# Patient Record
Sex: MALE
[De-identification: names, ages, dates, MRNs, and addresses within clinical notes are randomized per-mention and may not be internally consistent; named-entity substitution may affect disease eponyms.]

---

## 2021-01-25 ENCOUNTER — APPOINTMENT (OUTPATIENT)
Dept: UROLOGY | Facility: CLINIC | Age: 31
End: 2021-01-25

## 2021-01-29 PROBLEM — Z00.00 ENCOUNTER FOR PREVENTIVE HEALTH EXAMINATION: Status: ACTIVE | Noted: 2021-01-29

## 2021-02-02 ENCOUNTER — APPOINTMENT (OUTPATIENT)
Dept: UROLOGY | Facility: CLINIC | Age: 31
End: 2021-02-02
Payer: COMMERCIAL

## 2021-02-02 ENCOUNTER — TRANSCRIPTION ENCOUNTER (OUTPATIENT)
Age: 31
End: 2021-02-02

## 2021-02-05 ENCOUNTER — APPOINTMENT (OUTPATIENT)
Dept: UROLOGY | Facility: CLINIC | Age: 31
End: 2021-02-05
Payer: COMMERCIAL

## 2021-02-05 DIAGNOSIS — F52.32 MALE ORGASMIC DISORDER: ICD-10-CM

## 2021-02-05 PROCEDURE — 99204 OFFICE O/P NEW MOD 45 MIN: CPT | Mod: 95

## 2021-03-16 ENCOUNTER — TRANSCRIPTION ENCOUNTER (OUTPATIENT)
Age: 31
End: 2021-03-16

## 2021-04-12 PROBLEM — F52.32 DELAYED EJACULATION: Status: ACTIVE | Noted: 2021-03-19

## 2021-04-12 NOTE — ASSESSMENT
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  Primary and ejaculation occurs when men was never able to ejaculate intravaginally.  Secondary and ejaculation occurs when man has problems with intravaginal ejaculation after normal performance.  Ejaculatory disorders can be classified based on time, volume of ejaculation, and force of ejaculation.  If he takes more than 30 minutes of sexual stimulation to ejaculate, then men suffers from delayed ejaculation.  Decreased volume of ejaculation is often the result of medication like alpha blockers or 5 alpha reductase inhibitors however it can also be related to poor arousal and affect your nerve pathways with abnormal function of seminal vesicles and prostate.  Pelvic floor dysfunction with lack of relaxation of pelvic floor can also decrease volume of ejaculation.  Seminal vesicles of obstruction either functional or anatomical can result in ejaculatory volume decreases.  Force of ejaculation depends on contractility of bulbocavernosus muscle, seminal vesicles and prostate.  Tensioning the pelvic floor, trying to push the semen out, low level of arousal can all affect force of ejaculation.  Abnormal closure of bladder neck, BPH are also associated with decreased force of ejaculation.\par \par The reasons for delayed ejaculation or decreased force and volume of ejaculation are typically related to inadequate arousal and poor progression of arousal.  This can be due to decrease sensitivity in the penis, abnormal processing and recognition of sexual cues specifically visual, tactile, olfactory, auditory and recollection of events from the past including fantasies.\par \par Evaluation focuses on identifying medical reasons like diabetes, heavy metal poisoning, alcohol abuse, sensory nerve dysfunction, abnormal hormonal profile.\par \par Functional dynamic ejaculatory study allows for direct observation of pattern of stimulation, and answer the question if patient have idiosyncratic pattern of stimulation or tenses up pelvic floor muscles.  Transrectal ultrasound with ejaculatory study allows to better identified if functional or anatomical obstruction exists.\par \par There is no FDA approved medical treatment for ejaculatory dysfunction at this point.\par \par Correction of testosterone has been shown to improve volume of ejaculation and force of ejaculation.\par \par Dopaminergic signaling stimulation with Mirapex or trazodone has been shown to increase arousal and dialysis potentially improve ejaculatory force and ejaculation.\par \par Above was discussed with the patient.  We will start medical treatment as prescribed and patient will follow-up with additional studies as needed.\par \par

## 2021-04-12 NOTE — HISTORY OF PRESENT ILLNESS
[Home] : at home, [unfilled] , at the time of the visit. [Medical Office: ([facility])___] : at the medical office located in  [Verbal consent obtained from patient] : the patient, [unfilled] [FreeTextEntry1] : The patient-doctor relationship has been established in a face to face fashion via real time video/audio HIPAA compliant communication using telemedicine software.  \par The “patient” means “patient” or “parent” or “authorize representative” for purpose of consent. \par The patient's identity has been confirmed verifying available demographic data.\par The patient was previously emailed a copy of the telemedicine consent.  The patient has had a chance to review and has now given verbal consent and has requested care to be assessed and treated through telemedicine. Patient understands there maybe limitations in this process, and they may need further follow up care in the office and or hospital settings.   \par  \par \par \par \par \par Delayed ejaculation \par Life long\par Started to masturbate at 12-13\par Used to take half an hour when teenager\par Normal erection \par During sexual  intercourse 45 min \par Certain positions are better  but difficult most of the time\par Persistent \par Orgasm 4/10 both when masturbating and when having sex\par Medications: antidepressants: Prozac and Wellbutrin a month of Prozac\par Healthy otherwise\par Hard issues with concentration ADHD \par T 634 and PRL 24 before taking carbegoline\par No MRI of brain \par Carbegoline 0.25 mg BID noticed some improvement \par

## 2021-07-14 ENCOUNTER — RX RENEWAL (OUTPATIENT)
Age: 31
End: 2021-07-14

## 2021-12-20 ENCOUNTER — RX RENEWAL (OUTPATIENT)
Age: 31
End: 2021-12-20

## 2022-04-27 ENCOUNTER — RX RENEWAL (OUTPATIENT)
Age: 32
End: 2022-04-27

## 2022-04-27 RX ORDER — PRAMIPEXOLE DIHYDROCHLORIDE 0.25 MG/1
0.25 TABLET ORAL
Qty: 30 | Refills: 3 | Status: ACTIVE | COMMUNITY
Start: 2021-03-19 | End: 1900-01-01

## 2023-05-18 ENCOUNTER — NON-APPOINTMENT (OUTPATIENT)
Age: 33
End: 2023-05-18